# Patient Record
Sex: FEMALE | Race: WHITE | URBAN - METROPOLITAN AREA
[De-identification: names, ages, dates, MRNs, and addresses within clinical notes are randomized per-mention and may not be internally consistent; named-entity substitution may affect disease eponyms.]

---

## 2017-04-10 ENCOUNTER — IMPORTED ENCOUNTER (OUTPATIENT)
Dept: URBAN - METROPOLITAN AREA CLINIC 50 | Facility: CLINIC | Age: 82
End: 2017-04-10

## 2017-04-18 ENCOUNTER — IMPORTED ENCOUNTER (OUTPATIENT)
Dept: URBAN - METROPOLITAN AREA CLINIC 50 | Facility: CLINIC | Age: 82
End: 2017-04-18

## 2017-06-23 ENCOUNTER — IMPORTED ENCOUNTER (OUTPATIENT)
Dept: URBAN - METROPOLITAN AREA CLINIC 50 | Facility: CLINIC | Age: 82
End: 2017-06-23

## 2017-06-23 NOTE — PATIENT DISCUSSION
"""Start Lotemax OD TID x 2 weeks. "" ""Recommended OTC perservative free artificial tears two - four times a day as needed"""

## 2017-07-06 ENCOUNTER — IMPORTED ENCOUNTER (OUTPATIENT)
Dept: URBAN - METROPOLITAN AREA CLINIC 50 | Facility: CLINIC | Age: 82
End: 2017-07-06

## 2017-08-10 ENCOUNTER — IMPORTED ENCOUNTER (OUTPATIENT)
Dept: URBAN - METROPOLITAN AREA CLINIC 50 | Facility: CLINIC | Age: 82
End: 2017-08-10

## 2018-02-13 ENCOUNTER — IMPORTED ENCOUNTER (OUTPATIENT)
Dept: URBAN - METROPOLITAN AREA CLINIC 50 | Facility: CLINIC | Age: 83
End: 2018-02-13

## 2018-07-03 ENCOUNTER — IMPORTED ENCOUNTER (OUTPATIENT)
Dept: URBAN - METROPOLITAN AREA CLINIC 50 | Facility: CLINIC | Age: 83
End: 2018-07-03

## 2018-07-09 ENCOUNTER — IMPORTED ENCOUNTER (OUTPATIENT)
Dept: URBAN - METROPOLITAN AREA CLINIC 50 | Facility: CLINIC | Age: 83
End: 2018-07-09

## 2018-07-17 ENCOUNTER — IMPORTED ENCOUNTER (OUTPATIENT)
Dept: URBAN - METROPOLITAN AREA CLINIC 50 | Facility: CLINIC | Age: 83
End: 2018-07-17

## 2018-07-23 ENCOUNTER — IMPORTED ENCOUNTER (OUTPATIENT)
Dept: URBAN - METROPOLITAN AREA CLINIC 50 | Facility: CLINIC | Age: 83
End: 2018-07-23

## 2018-07-24 ENCOUNTER — IMPORTED ENCOUNTER (OUTPATIENT)
Dept: URBAN - METROPOLITAN AREA CLINIC 50 | Facility: CLINIC | Age: 83
End: 2018-07-24

## 2018-07-31 ENCOUNTER — IMPORTED ENCOUNTER (OUTPATIENT)
Dept: URBAN - METROPOLITAN AREA CLINIC 50 | Facility: CLINIC | Age: 83
End: 2018-07-31

## 2018-07-31 NOTE — PATIENT DISCUSSION
"""S/P IOL OD: 16.0 (Target: Distance) +TM. Continue post operative instructions and drops per schedule.  """

## 2018-08-01 ENCOUNTER — IMPORTED ENCOUNTER (OUTPATIENT)
Dept: URBAN - METROPOLITAN AREA CLINIC 50 | Facility: CLINIC | Age: 83
End: 2018-08-01

## 2018-08-02 ENCOUNTER — IMPORTED ENCOUNTER (OUTPATIENT)
Dept: URBAN - METROPOLITAN AREA CLINIC 50 | Facility: CLINIC | Age: 83
End: 2018-08-02

## 2018-08-06 ENCOUNTER — IMPORTED ENCOUNTER (OUTPATIENT)
Dept: URBAN - METROPOLITAN AREA CLINIC 50 | Facility: CLINIC | Age: 83
End: 2018-08-06

## 2018-08-06 NOTE — PATIENT DISCUSSION
""" """"S/P IOL OD: 16.0 (Target: Distance) +TM. Continue post operative instructions and drops per schedule.  """

## 2018-08-13 ENCOUNTER — IMPORTED ENCOUNTER (OUTPATIENT)
Dept: URBAN - METROPOLITAN AREA CLINIC 50 | Facility: CLINIC | Age: 83
End: 2018-08-13

## 2018-08-21 ENCOUNTER — IMPORTED ENCOUNTER (OUTPATIENT)
Dept: URBAN - METROPOLITAN AREA CLINIC 50 | Facility: CLINIC | Age: 83
End: 2018-08-21

## 2018-08-21 NOTE — PATIENT DISCUSSION
"""S/P IOL OS: Sensar AAB00 +16.50 (Target: Distance) +TM. Continue post operative instructions and drops per schedule.  """

## 2018-08-27 ENCOUNTER — IMPORTED ENCOUNTER (OUTPATIENT)
Dept: URBAN - METROPOLITAN AREA CLINIC 50 | Facility: CLINIC | Age: 83
End: 2018-08-27

## 2018-09-17 ENCOUNTER — IMPORTED ENCOUNTER (OUTPATIENT)
Dept: URBAN - METROPOLITAN AREA CLINIC 50 | Facility: CLINIC | Age: 83
End: 2018-09-17

## 2019-02-21 ENCOUNTER — IMPORTED ENCOUNTER (OUTPATIENT)
Dept: URBAN - METROPOLITAN AREA CLINIC 50 | Facility: CLINIC | Age: 84
End: 2019-02-21

## 2020-02-20 ENCOUNTER — IMPORTED ENCOUNTER (OUTPATIENT)
Dept: URBAN - METROPOLITAN AREA CLINIC 50 | Facility: CLINIC | Age: 85
End: 2020-02-20

## 2020-02-20 NOTE — PATIENT DISCUSSION
"""Continue Artificial tears both eyes two - four times a day ."" ""Continue Flaxseed oil by mouth by mouth twice a day . """

## 2021-03-10 ENCOUNTER — IMPORTED ENCOUNTER (OUTPATIENT)
Dept: URBAN - METROPOLITAN AREA CLINIC 50 | Facility: CLINIC | Age: 86
End: 2021-03-10

## 2021-04-18 ASSESSMENT — VISUAL ACUITY
OD_OTHER: >20/400.
OS_OTHER: 20/50. 20/60.
OD_OTHER: 20/40. 20/70.
OD_CC: 20/100
OS_CC: 20/60+1
OD_BAT: 20/70
OD_SC: 20/70
OS_CC: 20/20
OD_OTHER: >20/400.
OD_CC: 20/80+
OS_BAT: 20/70
OD_PH: 20/60
OD_PH: 20/60+1
OD_CC: 20/60
OD_CC: J1+
OS_BAT: 20/80
OD_BAT: 20/400
OS_CC: 20/400
OD_PH: 20/60-1
OS_OTHER: 20/80. 20/100.
OS_CC: J1+/-
OD_SC: 20/50
OS_OTHER: 20/50. 20/80.
OD_OTHER: 20/70. 20/80.
OS_CC: J1+/-
OS_SC: 20/30
OD_CC: 20/20
OD_BAT: 20/40
OD_CC: J1+
OD_CC: 20/70
OD_CC: 20/80
OS_CC: 20/40+
OD_CC: J1
OS_PH: 20/40+2
OS_OTHER: 20/100. 20/100.
OS_CC: 20/50
OD_CC: J1+/-
OD_SC: 20/400
OS_SC: 20/30-
OS_CC: J1
OD_BAT: 20/70
OS_PH: 20/40
OS_CC: 20/50-
OD_CC: 20/30
OD_CC: J1+/-
OD_CC: 20/50
OD_CC: 20/80-
OS_CC: 20/40-
OD_PH: 20/40
OS_BAT: 20/50
OS_BAT: 20/80
OS_BAT: 20/100
OD_PH: 20/30
OS_OTHER: 20/70. 20/80.
OD_SC: 20/200
OD_CC: 20/80=
OS_CC: 20/50
OD_BAT: >20/400
OS_BAT: 20/100
OD_SC: 20/50+
OS_OTHER: 20/80. 20/200.
OS_SC: 20/100
OD_OTHER: 20/70. 20/80.
OD_OTHER: 20/400.
OS_CC: J1+
OS_OTHER: 20/100. >20/400.
OS_SC: 20/80-
OS_OTHER: 20/100. 20/400.
OS_CC: 20/40-
OD_SC: 20/200
OS_BAT: 20/100
OS_BAT: 20/50
OS_CC: J1
OS_CC: J1+
OD_BAT: >20/400
OS_PH: 20/40+
OS_BAT: 20/100
OD_PH: 20/50
OD_OTHER: >20/400.
OD_BAT: >20/400
OD_CC: J1
OS_OTHER: 20/100. 20/100.
OS_CC: 20/20

## 2021-04-18 ASSESSMENT — PACHYMETRY
OS_CT_UM: 596
OD_CT_UM: 598
OS_CT_UM: 596
OS_CT_UM: 596
OD_CT_UM: 598
OS_CT_UM: 596
OD_CT_UM: 598
OS_CT_UM: 596
OS_CT_UM: 596
OD_CT_UM: 598
OS_CT_UM: 596
OS_CT_UM: 596
OD_CT_UM: 598
OS_CT_UM: 596
OS_CT_UM: 596

## 2021-04-18 ASSESSMENT — TONOMETRY
OS_IOP_MMHG: 17
OD_IOP_MMHG: 16
OD_IOP_MMHG: 22
OS_IOP_MMHG: 18
OS_IOP_MMHG: 15
OS_IOP_MMHG: 18
OS_IOP_MMHG: 18
OS_IOP_MMHG: 16
OD_IOP_MMHG: 16
OD_IOP_MMHG: 18
OD_IOP_MMHG: 18
OS_IOP_MMHG: 15
OS_IOP_MMHG: 12
OS_IOP_MMHG: 12
OD_IOP_MMHG: 15
OS_IOP_MMHG: 15
OS_IOP_MMHG: 15
OD_IOP_MMHG: 18
OD_IOP_MMHG: 18
OS_IOP_MMHG: 18
OS_IOP_MMHG: 16
OS_IOP_MMHG: 21
OD_IOP_MMHG: 15
OD_IOP_MMHG: 18
OD_IOP_MMHG: 13
OD_IOP_MMHG: 21
OD_IOP_MMHG: 20
OS_IOP_MMHG: 15
OS_IOP_MMHG: 13
OS_IOP_MMHG: 18
OD_IOP_MMHG: 15
OS_IOP_MMHG: 18
OD_IOP_MMHG: 18
OD_IOP_MMHG: 17
OD_IOP_MMHG: 15
OD_IOP_MMHG: 18
OD_IOP_MMHG: 21
OS_IOP_MMHG: 14
OS_IOP_MMHG: 17
OD_IOP_MMHG: 18
OS_IOP_MMHG: 19
OS_IOP_MMHG: 13
OD_IOP_MMHG: 17
OD_IOP_MMHG: 25
OS_IOP_MMHG: 18
OD_IOP_MMHG: 18
OD_IOP_MMHG: 13
OS_IOP_MMHG: 16
OD_IOP_MMHG: 16
OD_IOP_MMHG: 13
OS_IOP_MMHG: 13
OD_IOP_MMHG: 13
OS_IOP_MMHG: 18